# Patient Record
(demographics unavailable — no encounter records)

---

## 2024-11-12 NOTE — ASSESSMENT
[FreeTextEntry1] : 81 year F WITH MODERATE LT KNEE PAIN. PAIN WORSENS WITH STAIRS AND WALKING PROLONGED DISTANCES. PAIN IS AFFECTING ADL AND FUNCTIONAL ACTIVITIES. XRAYS REVIEWED WITH MODERATE MEDIAL, ADVANCED PF OA. TREATMENT OPTIONS REVIEWED. QUESTIONS ANSWERED.   LT KNEE CSI TODAY. PATIENT TOLERATED INJECTION WELL. TIMING REVIEWED

## 2024-11-12 NOTE — PHYSICAL EXAM
[Left] : left knee [NL (0)] : extension 0 degrees [5___] : quadriceps 5[unfilled]/5 [] : patient ambulates with assistive device [TWNoteComboBox7] : flexion 110 degrees

## 2024-11-12 NOTE — PROCEDURE
[de-identified] : Procedure Name: Large Joint Injection / Aspiration: Depomedrol and Marcaine Anesthesia: ethyl chloride sprayed topically..  Depomedrol: An injection of Depomedrol 80 mg , 1 cc.  Marcaine: 5 cc.  Medication was injected in the left knee. Patient has tried OTC's including aspirin, Ibuprofen, Aleve etc or prescription NSAIDS, and/or exercises at home and/ or physical therapy without satisfactory response. After verbal consent using sterile preparation and technique. The risks, benefits, and alternatives to cortisone injection were explained in full to the patient. Risks outlined include but are not limited to infection, sepsis, bleeding, scarring, skin discoloration, temporary  increase in pain, syncopal episode, failure to resolve symptoms, allergic reaction, symptom recurrence, and elevation of blood sugar in diabetics. Patient understood the risks. All questions were answered. After discussion of options, patient requested an injection. Oral informed consent was obtained and sterile prep was done of the injection site. Sterile technique was utilized for the procedure including the preparation of the solutions used for the injection. Patient tolerated the procedure well. Advised to ice the injection site this evening. Prep with alcohol locally to site. Sterile technique used. Post Procedure Instructions: Patient was advised to call if redness, pain, or fever occur and apply ice for 15 min. out of every hour for the next 12-24 hours as tolerated.

## 2024-11-12 NOTE — HISTORY OF PRESENT ILLNESS
[Retired] : Work status: retired [de-identified] : 7/12/24; pt has hx of right knee injection with relief from . pt has no hx of left knee injury   11.12.24 PATIENT HERE FOR BILATERAL KNEE PAIN. PATIENT STATES PAIN IS THE WORST WHEN SHE IS SITTING IN A CAR GOOD RELIEF WITH CORTISONE LAST VISIT [] : no [FreeTextEntry1] : left knee  [de-identified] : none

## 2025-04-22 NOTE — HISTORY OF PRESENT ILLNESS
[de-identified] : 7/12/24; pt has hx of right knee injection with relief from . pt has no hx of left knee injury   11.12.24 PATIENT HERE FOR BILATERAL KNEE PAIN. PATIENT STATES PAIN IS THE WORST WHEN SHE IS SITTING IN A CAR GOOD RELIEF WITH CORTISONE LAST VISIT  4/22/25: CALIXTO IS HERE FOPR A FOLLWO UP OF THE LEFT KNEE. ELIANA N HAS GOTTEN WORSE OVER TIME.  [8] : 8 [5] : 5 [Dull/Aching] : dull/aching [Sharp] : sharp [Retired] : Work status: retired [] : Post Surgical Visit: no [FreeTextEntry1] : left knee  [de-identified] : none

## 2025-05-20 NOTE — PROCEDURE
[de-identified] : Procedure Name: Euflexxa (Large Joint)  Viscosupplementation Injection: X-ray evidence of Osteoarthritis on this or prior visit, Patient has tried OTC's including aspirin, Ibuprofen, Aleve etc or prescription NSAIDS, and/or exercises at home and/ or physical therapy without satisfactory response and Repeat series performed because patient had significant improvement in their pain and functional capacity from prior series which was given more than six months ago.   An injection of Euflexxa 2ml #1 was injected into the left knee(s). after verbal consent using sterile technique. The risks, benefits, and alternatives to Viscosupplementation injection were explained in full to the patient. Risks outlined include but are not limited to infection, sepsis, bleeding, scarring, skin discoloration, temporary increase in pain, syncopal episode, failure to resolve symptoms, allergic reaction, and symptom recurrence. Signs and symptoms of infection reviewed and patient advised to call immediately for redness, fevers, and/or chills. Patient understood the risks. All questions were answered. After discussion of options, patient requested Viscosupplementation. The patient tolerated the procedure well. Ice tonight to the injection site.

## 2025-05-20 NOTE — ASSESSMENT
[FreeTextEntry1] : 82 year F WITH MODERATE LT KNEE PAIN. PAIN WORSENS WITH STAIRS AND WALKING PROLONGED DISTANCES. PAIN IS AFFECTING ADL AND FUNCTIONAL ACTIVITIES. XRAYS REVIEWED WITH MODERATE MEDIAL, ADVANCED PF OA. TREATMENT OPTIONS REVIEWED. QUESTIONS ANSWERED.   LT KNEE EUFLEXXA #1 TODAY. PATIENT TOLERATED INJECTION WELL. TIMING REVIEWED

## 2025-05-27 NOTE — ASSESSMENT
[FreeTextEntry1] : 82 year F WITH MODERATE LT KNEE PAIN. PAIN WORSENS WITH STAIRS AND WALKING PROLONGED DISTANCES. PAIN IS AFFECTING ADL AND FUNCTIONAL ACTIVITIES. XRAYS REVIEWED WITH MODERATE MEDIAL, ADVANCED PF OA. TREATMENT OPTIONS REVIEWED. QUESTIONS ANSWERED.   LT KNEE EUFLEXXA #2 TODAY. PATIENT TOLERATED INJECTION WELL. TIMING REVIEWED

## 2025-05-27 NOTE — PROCEDURE
[de-identified] : Procedure Name: Euflexxa (Large Joint)  Viscosupplementation Injection: X-ray evidence of Osteoarthritis on this or prior visit, Patient has tried OTC's including aspirin, Ibuprofen, Aleve etc or prescription NSAIDS, and/or exercises at home and/ or physical therapy without satisfactory response and Repeat series performed because patient had significant improvement in their pain and functional capacity from prior series which was given more than six months ago.   An injection of Euflexxa 2ml #2 was injected into the left knee(s). after verbal consent using sterile technique. The risks, benefits, and alternatives to Viscosupplementation injection were explained in full to the patient. Risks outlined include but are not limited to infection, sepsis, bleeding, scarring, skin discoloration, temporary increase in pain, syncopal episode, failure to resolve symptoms, allergic reaction, and symptom recurrence. Signs and symptoms of infection reviewed and patient advised to call immediately for redness, fevers, and/or chills. Patient understood the risks. All questions were answered. After discussion of options, patient requested Viscosupplementation. The patient tolerated the procedure well. Ice tonight to the injection site.

## 2025-06-03 NOTE — HISTORY OF PRESENT ILLNESS
[de-identified] : 5/27/25; PATIENT IS HERE FOR FOLLWO UP OF THE LEFT KNEE EUFLEXXA #2  6/3/25; PATIENT IS HERE FOR FOLLWO UP OF THE LEFT KNEE EUFLEXXA #3

## 2025-06-03 NOTE — ASSESSMENT
[FreeTextEntry1] : 82 year F WITH MODERATE LT KNEE PAIN. PAIN WORSENS WITH STAIRS AND WALKING PROLONGED DISTANCES. PAIN IS AFFECTING ADL AND FUNCTIONAL ACTIVITIES. XRAYS REVIEWED WITH MODERATE MEDIAL, ADVANCED PF OA. TREATMENT OPTIONS REVIEWED. QUESTIONS ANSWERED.   LT KNEE EUFLEXXA #3 TODAY. PATIENT TOLERATED INJECTION WELL. TIMING REVIEWED

## 2025-06-03 NOTE — PROCEDURE
[de-identified] : Procedure Name: Euflexxa (Large Joint)  Viscosupplementation Injection: X-ray evidence of Osteoarthritis on this or prior visit, Patient has tried OTC's including aspirin, Ibuprofen, Aleve etc or prescription NSAIDS, and/or exercises at home and/ or physical therapy without satisfactory response and Repeat series performed because patient had significant improvement in their pain and functional capacity from prior series which was given more than six months ago.   An injection of Euflexxa 2ml #3 was injected into the left knee(s). after verbal consent using sterile technique. The risks, benefits, and alternatives to Viscosupplementation injection were explained in full to the patient. Risks outlined include but are not limited to infection, sepsis, bleeding, scarring, skin discoloration, temporary increase in pain, syncopal episode, failure to resolve symptoms, allergic reaction, and symptom recurrence. Signs and symptoms of infection reviewed and patient advised to call immediately for redness, fevers, and/or chills. Patient understood the risks. All questions were answered. After discussion of options, patient requested Viscosupplementation. The patient tolerated the procedure well. Ice tonight to the injection site.